# Patient Record
Sex: MALE | Race: BLACK OR AFRICAN AMERICAN | NOT HISPANIC OR LATINO | URBAN - METROPOLITAN AREA
[De-identification: names, ages, dates, MRNs, and addresses within clinical notes are randomized per-mention and may not be internally consistent; named-entity substitution may affect disease eponyms.]

---

## 2024-04-01 ENCOUNTER — EMERGENCY (EMERGENCY)
Facility: HOSPITAL | Age: 29
LOS: 0 days | Discharge: HOME | End: 2024-04-02
Attending: EMERGENCY MEDICINE
Payer: SELF-PAY

## 2024-04-01 VITALS
RESPIRATION RATE: 18 BRPM | HEART RATE: 78 BPM | OXYGEN SATURATION: 98 % | SYSTOLIC BLOOD PRESSURE: 116 MMHG | WEIGHT: 149.91 LBS | DIASTOLIC BLOOD PRESSURE: 80 MMHG | HEIGHT: 67 IN | TEMPERATURE: 99 F

## 2024-04-01 DIAGNOSIS — F41.9 ANXIETY DISORDER, UNSPECIFIED: ICD-10-CM

## 2024-04-01 DIAGNOSIS — F12.99 CANNABIS USE, UNSPECIFIED WITH UNSPECIFIED CANNABIS-INDUCED DISORDER: ICD-10-CM

## 2024-04-01 DIAGNOSIS — Z20.822 CONTACT WITH AND (SUSPECTED) EXPOSURE TO COVID-19: ICD-10-CM

## 2024-04-01 DIAGNOSIS — F29 UNSPECIFIED PSYCHOSIS NOT DUE TO A SUBSTANCE OR KNOWN PHYSIOLOGICAL CONDITION: ICD-10-CM

## 2024-04-01 DIAGNOSIS — Z91.013 ALLERGY TO SEAFOOD: ICD-10-CM

## 2024-04-01 LAB
ALBUMIN SERPL ELPH-MCNC: 3.9 G/DL — SIGNIFICANT CHANGE UP (ref 3.3–5)
ALP SERPL-CCNC: 47 U/L — SIGNIFICANT CHANGE UP (ref 40–120)
ALT FLD-CCNC: 29 U/L — SIGNIFICANT CHANGE UP (ref 12–78)
AMPHET UR-MCNC: NEGATIVE — SIGNIFICANT CHANGE UP
ANION GAP SERPL CALC-SCNC: 15 MMOL/L — SIGNIFICANT CHANGE UP (ref 5–17)
APAP SERPL-MCNC: < 2 UG/ML (ref 10–30)
AST SERPL-CCNC: 25 U/L — SIGNIFICANT CHANGE UP (ref 15–37)
BARBITURATES UR SCN-MCNC: NEGATIVE — SIGNIFICANT CHANGE UP
BASOPHILS # BLD AUTO: 0.03 K/UL — SIGNIFICANT CHANGE UP (ref 0–0.2)
BASOPHILS NFR BLD AUTO: 0.7 % — SIGNIFICANT CHANGE UP (ref 0–2)
BENZODIAZ UR-MCNC: NEGATIVE — SIGNIFICANT CHANGE UP
BILIRUB SERPL-MCNC: 0.5 MG/DL — SIGNIFICANT CHANGE UP (ref 0.2–1.2)
BUN SERPL-MCNC: 10 MG/DL — SIGNIFICANT CHANGE UP (ref 7–23)
CALCIUM SERPL-MCNC: 8.9 MG/DL — SIGNIFICANT CHANGE UP (ref 8.5–10.1)
CHLORIDE SERPL-SCNC: 106 MMOL/L — SIGNIFICANT CHANGE UP (ref 96–108)
CO2 SERPL-SCNC: 18 MMOL/L — LOW (ref 22–31)
COCAINE METAB.OTHER UR-MCNC: NEGATIVE — SIGNIFICANT CHANGE UP
CREAT SERPL-MCNC: 1.35 MG/DL — HIGH (ref 0.5–1.3)
EGFR: 74 ML/MIN/1.73M2 — SIGNIFICANT CHANGE UP
EOSINOPHIL # BLD AUTO: 0.23 K/UL — SIGNIFICANT CHANGE UP (ref 0–0.5)
EOSINOPHIL NFR BLD AUTO: 5.3 % — SIGNIFICANT CHANGE UP (ref 0–6)
ETHANOL SERPL-MCNC: <10 MG/DL — SIGNIFICANT CHANGE UP (ref 0–10)
FLUAV AG NPH QL: SIGNIFICANT CHANGE UP
FLUBV AG NPH QL: SIGNIFICANT CHANGE UP
GLUCOSE SERPL-MCNC: 110 MG/DL — HIGH (ref 70–99)
HCT VFR BLD CALC: 42.1 % — SIGNIFICANT CHANGE UP (ref 39–50)
HGB BLD-MCNC: 14 G/DL — SIGNIFICANT CHANGE UP (ref 13–17)
IMM GRANULOCYTES NFR BLD AUTO: 0 % — SIGNIFICANT CHANGE UP (ref 0–0.9)
LYMPHOCYTES # BLD AUTO: 1.63 K/UL — SIGNIFICANT CHANGE UP (ref 1–3.3)
LYMPHOCYTES # BLD AUTO: 37.5 % — SIGNIFICANT CHANGE UP (ref 13–44)
MCHC RBC-ENTMCNC: 29.7 PG — SIGNIFICANT CHANGE UP (ref 27–34)
MCHC RBC-ENTMCNC: 33.3 G/DL — SIGNIFICANT CHANGE UP (ref 32–36)
MCV RBC AUTO: 89.4 FL — SIGNIFICANT CHANGE UP (ref 80–100)
METHADONE UR-MCNC: NEGATIVE — SIGNIFICANT CHANGE UP
MONOCYTES # BLD AUTO: 0.5 K/UL — SIGNIFICANT CHANGE UP (ref 0–0.9)
MONOCYTES NFR BLD AUTO: 11.5 % — SIGNIFICANT CHANGE UP (ref 2–14)
NEUTROPHILS # BLD AUTO: 1.96 K/UL — SIGNIFICANT CHANGE UP (ref 1.8–7.4)
NEUTROPHILS NFR BLD AUTO: 45 % — SIGNIFICANT CHANGE UP (ref 43–77)
NRBC # BLD: 0 /100 WBCS — SIGNIFICANT CHANGE UP (ref 0–0)
OPIATES UR-MCNC: NEGATIVE — SIGNIFICANT CHANGE UP
PCP SPEC-MCNC: SIGNIFICANT CHANGE UP
PCP UR-MCNC: NEGATIVE — SIGNIFICANT CHANGE UP
PLATELET # BLD AUTO: 212 K/UL — SIGNIFICANT CHANGE UP (ref 150–400)
POTASSIUM SERPL-MCNC: 3.4 MMOL/L — LOW (ref 3.5–5.3)
POTASSIUM SERPL-SCNC: 3.4 MMOL/L — LOW (ref 3.5–5.3)
PROT SERPL-MCNC: 7.7 GM/DL — SIGNIFICANT CHANGE UP (ref 6–8.3)
RBC # BLD: 4.71 M/UL — SIGNIFICANT CHANGE UP (ref 4.2–5.8)
RBC # FLD: 12.6 % — SIGNIFICANT CHANGE UP (ref 10.3–14.5)
SALICYLATES SERPL-MCNC: <1.7 MG/DL — LOW (ref 2.8–20)
SARS-COV-2 RNA SPEC QL NAA+PROBE: SIGNIFICANT CHANGE UP
SODIUM SERPL-SCNC: 139 MMOL/L — SIGNIFICANT CHANGE UP (ref 135–145)
THC UR QL: POSITIVE — SIGNIFICANT CHANGE UP
WBC # BLD: 4.35 K/UL — SIGNIFICANT CHANGE UP (ref 3.8–10.5)
WBC # FLD AUTO: 4.35 K/UL — SIGNIFICANT CHANGE UP (ref 3.8–10.5)

## 2024-04-01 PROCEDURE — 93010 ELECTROCARDIOGRAM REPORT: CPT

## 2024-04-01 PROCEDURE — 99285 EMERGENCY DEPT VISIT HI MDM: CPT

## 2024-04-01 PROCEDURE — 90792 PSYCH DIAG EVAL W/MED SRVCS: CPT | Mod: 95,GC

## 2024-04-01 RX ORDER — HALOPERIDOL DECANOATE 100 MG/ML
5 INJECTION INTRAMUSCULAR ONCE
Refills: 0 | Status: COMPLETED | OUTPATIENT
Start: 2024-04-01 | End: 2024-04-01

## 2024-04-01 RX ADMIN — HALOPERIDOL DECANOATE 5 MILLIGRAM(S): 100 INJECTION INTRAMUSCULAR at 18:04

## 2024-04-01 RX ADMIN — Medication 2 MILLIGRAM(S): at 18:04

## 2024-04-01 NOTE — ED BEHAVIORAL HEALTH ASSESSMENT NOTE - RISK ASSESSMENT
risk factors: agitation, anxiety, triggering events/stressors, past IP admission, not engaged in treatment     protective: supportive family, enrolled in college

## 2024-04-01 NOTE — ED PROVIDER NOTE - OBJECTIVE STATEMENT
history obtained from mom as pt will not speak to me.   28-year-old male with PMH ?anxiety (not on meds had one 2 wk IPP admission 3 years ago for anxiety-- although mom describes it as "rage, throwing things, hearing voices") brought in by mom for feeling overwhelmed, rage, throwing things, hearing voices, insomnia x 2 wks.   He has a therapist/counselor who he sees, but per mom not often.   not on any meds  mom does not recall name of medication he used to take.   socially drinks alcohol.   no drugs/smoking.

## 2024-04-01 NOTE — ED ADULT NURSE NOTE - OBJECTIVE STATEMENT
28 year old male A/Ox3 accompanied by mother for psych evaluation, pt states "I don't feel right, I want to get tested for everything", when asked more information pt states "cancer, leukemia, brain tumor, all of it", pt denies chest pain, abdominal pain, n/v, fever/chills, pt uncooperative with poor contact at this time, unable to gather more information, mother at bedside, 1:1 initiated in triage

## 2024-04-01 NOTE — ED PROVIDER NOTE - PATIENT PORTAL LINK FT
You can access the FollowMyHealth Patient Portal offered by [facility] by registering at the following website: [email or web address]. By joining [email or web address] FollowMyHealth portal, you will also be able to view your health information using other applications (apps) compatible with our system.
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
## 2024-04-01 NOTE — ED PROVIDER NOTE - PHYSICAL EXAMINATION
PHYSICAL EXAM:    GENERAL: Alert, appears stated age, well appearing, non-toxic  SKIN: Warm, and dry. MMM.   HEAD: NC, AT, no step offs   EYE: Normal lids/conjunctiva, PERRL, EOMI  ENT: Normal hearing, patent oropharynx   Pulm: Bilateral BS, normal resp effort, no wheezes, stridor, or retractions  CV: RRR, no M/R/G, 2+and = radial pulses  Abd: soft, non-tender, non-distended  Mskel: no erythema, cyanosis, edema. no calf tenderness  Neuro: AAOx3, normal gait  Psych: poor insight, screaming, yelling, verbally aggressive to staff, pushing/throwing objects around ED.

## 2024-04-01 NOTE — ED PROVIDER NOTE - PROGRESS NOTE DETAILS
VINH GONZALEZ: pt is sleeping comfortably. when pt is more awake, will call telepsych to eval pt. VINH GONZALEZ: - late entry-- on my initial eval, pt verbally aggressive, saying we are killing his family, pushing chairs around ED--multiple unsuccessful attempts to deescalate.   sedated with ativan/haldol. VINH GONZALEZ: Lele [phone]

## 2024-04-01 NOTE — ED ADULT NURSE REASSESSMENT NOTE - DESCRIPTION
Constant observation in progress, 1:1 sitter within arms reach, need for CO reassessed. Pt resting on stretcher, asleep, arousable to touch but very drowsy with adequate respiratory effort.

## 2024-04-01 NOTE — ED BEHAVIORAL HEALTH ASSESSMENT NOTE - SUBSTANCE ISSUES AND PLAN (INCLUDE STANDING AND PRN MEDICATION)
no medication assisted treatment indicated at this time per history, but further evaluation for substance use is necessary

## 2024-04-01 NOTE — ED PROVIDER NOTE - CLINICAL SUMMARY MEDICAL DECISION MAKING FREE TEXT BOX
ddx: schizophrenia, anxiety, depression, psychosis   pending psych consult ddx: schizophrenia, anxiety, depression, psychosis   pending psych consult    Patient seen and evaluated by me.  Patient on a 1:1 and psychiatry consulted.  Appropriate clearance labs sent, EKG.  ED work up reviewed and psychiatry evaluated patient.  Patient is medically cleared and psychiatry cleared for discharge. patient to go home with mom, return precautions

## 2024-04-01 NOTE — ED ADULT NURSE NOTE - NSFALLLASTSIX_ED_ALL_ED
Atrial fibrillation    Borderline hypertension    PAF (paroxysmal atrial fibrillation)    Renal colic No.

## 2024-04-01 NOTE — ED ADULT NURSE NOTE - NSFALLUNIVINTERV_ED_ALL_ED
Bed/Stretcher in lowest position, wheels locked, appropriate side rails in place/Call bell, personal items and telephone in reach/Instruct patient to call for assistance before getting out of bed/chair/stretcher/Non-slip footwear applied when patient is off stretcher/[place] to call system/Physically safe environment - no spills, clutter or unnecessary equipment/Purposeful proactive rounding/Room/bathroom lighting operational, light cord in reach

## 2024-04-01 NOTE — ED BEHAVIORAL HEALTH ASSESSMENT NOTE - DIFFERENTIAL
anxiety vs. psychosis Psychosis i/s/o substance use vs an underlying psychotic illness  Cannabis use

## 2024-04-01 NOTE — ED BEHAVIORAL HEALTH ASSESSMENT NOTE - NS ED BHA PLAN
Persistent L sided back pain; pt admits to R sided groin pain few weeks ago that has essentially subsided Hold in ED for Reassessment

## 2024-04-01 NOTE — ED ADULT TRIAGE NOTE - CHIEF COMPLAINT QUOTE
as per mother, pt having anxiety, panic attacks x 2-3 weeks. panic attacks are accompanied by losing track of time, + insomnia. mother also noticed pt throwing objects, getting angry, hearing voices. pt states called mother today due to "not feeling well". pt denies chest pain, palpitations, sob. Denies SI, HI. pt has poor eye contact, low speech in triage. states had panic attack, which lasted 4 hours in 2016, which resolved.

## 2024-04-01 NOTE — ED BEHAVIORAL HEALTH ASSESSMENT NOTE - NSBHATTESTCOMMENTATTENDFT_PSY_A_CORE
This write reviewed the above document, made edits where appropriate, and agree with the assessment and plan.

## 2024-04-01 NOTE — ED BEHAVIORAL HEALTH ASSESSMENT NOTE - NSBHSACONSEQUENCE_PSY_A_CORE FT
none known at this time but more info is needed from expanded pt interview.  mom denies the pt ever having DTs, seizures or withdrawal

## 2024-04-01 NOTE — ED BEHAVIORAL HEALTH ASSESSMENT NOTE - SUMMARY
27yo single male, domiciled between his own apartment and his mom's apartment, is facing eviction from his apartment, none-caregiver, enrolled in [employer], has no significant PMHx, has PPHx of anxiety, 1 past IP admission at [facility] for anxiety(?) - reportedly he took valerian root to fall asleep and called a hotline thinking he took too much and was somehow brought to the ED and admitted however details are very unclear; no known past suicide attempts, no hx of self harm, has some hx of aggression (throwing things) when angry, no hx of violence, +hx of cannabis use and social alcohol use, no hx of arrests but has upcoming court case for back due rent.  Patient was BIB mother for anxiety after he called her asking her for help today.    On attempt to evaluate the patient he is sedated, unable to be aroused, secondary to administration of PRNs haldol and ativan for agitation. It's unclear why the patient became agitated in the ED as collateral only reports that patient has a hx of anxiety.  Mother denies pt ever having aggression or violence in the past, and she denies any hx of psychosis, you, suicidality, or homicidality.  Due to his agitation in the ED, there is some concern that patient may be off his baseline, and considering he has risk factors of past inpatient psychiatry admission, there may be more significant information in his history besides anxiety alone.  Additionally, patient reportedly making statements about staffing trying to kill his family, and thus concern for psychosis necessitates further evaluation.  At this time patient is not psychiatrically cleared and requires reevaluation.  Recommend holding for metabolizing of sedating medications, continue 1:1 constant observation.      Recommendations:  - continue 1:1 constant observation  - hold for reevaluation by psychiatry once patient is able to be interviewed  -Severe agitation/aggression:  Ativan 2 mg PO Q6H PRN or haldol 5 mg PO Q6H PRN for severe agitation not amenable to verbal redirection with escalation to IM if patient refuses PO or becomes a danger to self, others or property.  monitor ECG and d/c all psychotropics if QTC is 500ms or greater 29yo single male, domiciled between his own apartment and his mom's apartment, is facing eviction from his apartment, non-caregiver, enrolled in [employer], has no significant PMHx, has PPHx of anxiety, 1 past IP admission at [facility] for anxiety(?) - reportedly he took valerian root to fall asleep and called a hotline thinking he took too much and was subsequently admitted(however details are unclear); no known past suicide attempts, no hx of self harm, has hx of aggression (throwing things) when angry, +hx of cannabis use and social alcohol use, no hx of arrests but has upcoming court case for back due rent.  Patient was BIB mother for anxiety after he called her asking her for help today.  On presentation to the ED the patient appeared paranoid, was making bizarre statements about [place] being in danger (mom confirmed they have no family [place]) and was staring down at the ground while answering questions, appearing distressed, and upon further questioning became agitated and required manual hold/IM meds for safety       Psychiatry attempted to evaluate pt after receiving IM meds, at which time he was too sedated to participate in interview and was unable to be aroused. It's unclear why the patient became agitated in the ED or why he was making bizarre statements that were not based in reality, as collateral only reports that he has a hx of "anxiety" and no other psych diagnoses. Collateral also indicates the pt has no known hx of violence toward others and no recent symptoms of psychosis, you, suicidality, or homicidality.  At this time, the differential includes unspecified psychosis, which may be in the setting of acute intoxication vs an underlying primary psychotic illness and as such he necessitates further evaluation.  At this time patient is not psychiatrically cleared and requires reevaluation.  Recommend holding in the ED for reassessment after he has time to metabolize.      Recommendations:  - continue 1:1 constant observation  - hold for reevaluation by psychiatry once patient is able to be interviewed  -Severe agitation/aggression:  Ativan 2 mg PO Q6H PRN or haldol 5 mg PO Q6H PRN for severe agitation not amenable to verbal redirection with escalation to IM if patient refuses PO or becomes a danger to self, others or property.  monitor ECG and d/c all psychotropics if QTC is 500ms or greater

## 2024-04-01 NOTE — ED PROVIDER NOTE - NSFOLLOWUPINSTRUCTIONS_ED_ALL_ED_FT
Anxiety    Generalized anxiety disorder (GAMALIEL) is a mental disorder. It is defined as anxiety that is not necessarily related to specific events or activities or is out of proportion to said events. Symptoms include restlessness, fatigue, difficulty concentrations, irritability and difficulty concentrating. It may interfere with life functions, including relationships, work, and school. If you were started on a medication, make sure to take exactly as prescribed and follow up with a psychiatrist.    SEEK IMMEDIATE MEDICAL CARE IF YOU HAVE ANY OF THE FOLLOWING SYMPTOMS: thoughts about hurting killing yourself, thoughts about hurting or killing somebody else, hallucinations, or worsening depression.
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
## 2024-04-01 NOTE — ED ADULT NURSE NOTE - ED STAT RN HANDOFF DETAILS
Report given to RN Olga for my break coverage; plan of care, pending labs / rads, and issues brought up by pt endorsed to covering RN.

## 2024-04-02 VITALS
DIASTOLIC BLOOD PRESSURE: 72 MMHG | HEART RATE: 77 BPM | OXYGEN SATURATION: 98 % | RESPIRATION RATE: 18 BRPM | TEMPERATURE: 98 F | SYSTOLIC BLOOD PRESSURE: 104 MMHG

## 2024-04-02 DIAGNOSIS — F29 UNSPECIFIED PSYCHOSIS NOT DUE TO A SUBSTANCE OR KNOWN PHYSIOLOGICAL CONDITION: ICD-10-CM

## 2024-04-02 PROCEDURE — 99213 OFFICE O/P EST LOW 20 MIN: CPT | Mod: 95

## 2024-04-02 RX ORDER — SODIUM CHLORIDE 9 MG/ML
1000 INJECTION INTRAMUSCULAR; INTRAVENOUS; SUBCUTANEOUS ONCE
Refills: 0 | Status: DISCONTINUED | OUTPATIENT
Start: 2024-04-02 | End: 2024-04-02

## 2024-04-02 NOTE — ED BEHAVIORAL HEALTH PROGRESS NOTE - RISK ASSESSMENT
risk factors: agitation, anxiety, triggering events/stressors, past IP admission, not engaged in treatment, substance use    protective: supportive family, enrolled in college, lack of prior suicide attempts, lack of access to firearms, lack of current psychosis, future oriented thinking

## 2024-04-02 NOTE — ED BEHAVIORAL HEALTH PROGRESS NOTE - MEDICATIONS (PRESCRIPTIONS, DIRECTIONS)
No indication for new medication prescription at this time as he does not yet have outpatient followup
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
## 2024-04-02 NOTE — ED BEHAVIORAL HEALTH PROGRESS NOTE - COLLATERAL INFORMATION (NAME, PHONE, RELATIONSHIP):
Chart reviewed, received signout, patient reassessed. Today patient states that he feels better though upset that he is in the ED. He states that he has been anxious over physical symptoms like dizziness and stomach pain, and that his mood has been low because of that, also he has been getting 4h of sleep but states that this is normal for him. Denies suicide ideation and HI, denies auditory, visual, or tactile hallucinations, denies paranoia. When asked about statements of [place] he denied stating that, denied having relatives there. When asked about agitation he stated that he was here for physical complaints and was told he had to put on gown and could not leave so he was frustrated. He denies etoh and drug use. He states that he wants to go home, is open to outpatient referral, does not want to come into the hospital.

## 2024-04-02 NOTE — ED BEHAVIORAL HEALTH PROGRESS NOTE - LACKING INFO FOR PSYCH DISPO DETAILS FREE TEXT
98 See BTCM note for collateral from mother. This writer spoke to mother today who again stated that she felt safe with him returning home with walk in center followup

## 2024-04-02 NOTE — ED BEHAVIORAL HEALTH PROGRESS NOTE - SAFETY PLAN ADDT'L DETAILS
Safety plan discussed with.../Education provided regarding environmental safety / lethal means restriction/Provision of National Suicide Prevention Lifeline 7-701-475-DYZD (5600)

## 2024-04-02 NOTE — ED BEHAVIORAL HEALTH PROGRESS NOTE - NSBHMSETHTASSOC_PSY_A_CORE
Case Management Discharge Note      Final Note: DC'd to [facility]......sk         Selected Continued Care - Discharged on 2/3/2021 Admission date: 1/29/2021 - Discharge disposition: Rehab Facility or Unit ([facility])    Destination Coordination complete    Service Provider Selected Services Address Phone Fax Patient Preferred    [facility]  Inpatient Rehabilitation [address] [id number] [phone] -- --          Durable Medical Equipment    No services have been selected for the patient.              Dialysis/Infusion    No services have been selected for the patient.              Home Medical Care    No services have been selected for the patient.              Therapy    No services have been selected for the patient.              Community Resources    No services have been selected for the patient.                       Final Discharge Disposition Code: 62 - inpatient rehab facility([facility])   Normal

## 2024-04-02 NOTE — ED ADULT NURSE REASSESSMENT NOTE - NS ED NURSE REASSESS COMMENT FT1
Patient cleared by physiatry with safe discharge to mom. Patient calm and cooperating, awaiting  by mom at this time.
sw mom to confirm she is coming and she said another 30mins
Handoff report received from [facility] Pham for shift change. Plan of care reviewed. Pt received resting on stretcher. Constant observation in progress, 1:1 sitter within arms reach, need for CO reassessed. Pt asleep at this moment, arousable with touch but very drowsy. Plan is to f/u w/ TP when pt more alert.
Patient received on stretcher, awake and on 1:1 observation for safety. Patient not answering questions but is calm and well appearing, cooperating with VS. Patient updated on POC. Awaiting tele psych re evaluation.

## 2024-04-02 NOTE — ED BEHAVIORAL HEALTH PROGRESS NOTE - CASE SUMMARY/FORMULATION (CLEARLY DOCUMENT RATIONALE FOR DISPOSITION CHANGE)
28M, lives w mom but has own apt facing eviction, enrolled in [employer] studying [employer]ology, no PMH, psych hx of anxiety, one hosp at [place] (said he took valerian root, called hotline, then admitted but unclear details), no SA or self harm, has hx of throwing things when angry, no arrests but has upcoming court case for back rent, now BIB mom after he called for help but in ED was paranoid, bizarre statements about [place] family that did not exist, staring down at ground, eventually became agitated and required IMs    On reassessment today patient states that he feels well though does not want to be in the ED. He denies suicide ideation, HI, denies symptoms of psychosis (no auditory, visual, or tactile hallucinations, no paranoia), denies that he has family in [place], and on exam there is no sign of psychosis. He does report anxiety over physical symptoms that he has had for the last year and we cannot rule out a delusional component there at this time but there is no indication that it is resulting in him being an imminent threat to himself or anyone else at this time. His UTox was positive for marijuana and it is possible that this contributed to his bizarre behavior and thought process last night. Patient clearly states that he does not want to come into the hospital and it does not appear that he meets criteria for involuntary hospitalization at this time. He is therefore cleared for discharge in the interest of patient autonomy. He is willing to accept outpatient referral to [facility] crisis center, and this was discussed with mother who will pick him up.
